# Patient Record
Sex: FEMALE | Race: NATIVE HAWAIIAN OR OTHER PACIFIC ISLANDER | NOT HISPANIC OR LATINO | Employment: FULL TIME | URBAN - NONMETROPOLITAN AREA
[De-identification: names, ages, dates, MRNs, and addresses within clinical notes are randomized per-mention and may not be internally consistent; named-entity substitution may affect disease eponyms.]

---

## 2023-06-15 ENCOUNTER — OCCUPATIONAL MEDICINE (OUTPATIENT)
Dept: URGENT CARE | Facility: PHYSICIAN GROUP | Age: 28
End: 2023-06-15
Payer: COMMERCIAL

## 2023-06-15 VITALS
HEART RATE: 79 BPM | WEIGHT: 293 LBS | SYSTOLIC BLOOD PRESSURE: 134 MMHG | TEMPERATURE: 97.2 F | HEIGHT: 64 IN | BODY MASS INDEX: 50.02 KG/M2 | DIASTOLIC BLOOD PRESSURE: 88 MMHG | OXYGEN SATURATION: 97 % | RESPIRATION RATE: 16 BRPM

## 2023-06-15 DIAGNOSIS — S39.012D STRAIN OF LUMBAR REGION, SUBSEQUENT ENCOUNTER: ICD-10-CM

## 2023-06-15 PROCEDURE — 99204 OFFICE O/P NEW MOD 45 MIN: CPT | Performed by: PHYSICIAN ASSISTANT

## 2023-06-15 PROCEDURE — 3079F DIAST BP 80-89 MM HG: CPT | Performed by: PHYSICIAN ASSISTANT

## 2023-06-15 PROCEDURE — 3075F SYST BP GE 130 - 139MM HG: CPT | Performed by: PHYSICIAN ASSISTANT

## 2023-06-15 RX ORDER — METHYLPREDNISOLONE 4 MG/1
TABLET ORAL
Qty: 21 TABLET | Refills: 0 | Status: SHIPPED | OUTPATIENT
Start: 2023-06-15

## 2023-06-15 ASSESSMENT — ENCOUNTER SYMPTOMS: BACK PAIN: 1

## 2023-06-15 NOTE — PROGRESS NOTES
"Subjective     Dandy Lee Wachsmuth is a 28 y.o. female who presents with Back Pain (Wc 6/12 pt was pushing lid open to roll off dumpster  and it would not budge, tried pulling and nothing, she continued pushing and finally was able to open lid, pt felt pain on lower back, pain radiating down to buttocks, pain radiates down R side of leg to feet and tingling sensation, some pain radiating down L leg to knee, 8/10 constant pain, started having some R shoulder pain yesterday, )      DOI 6/12/2023: Patient states she was opening a sliding lid on a giant rolloff dumpster and it was stuck and she used all of her strength to push open the lid and it finally opened and slammed to the other hand causing her back to drawer.  She is now having discomfort in her lower back.  Patient denies any previous history of low back pain or injury.  She states that yesterday her right shoulder started hurting.  She states after the initial injury she did go to [facility] in [place] and was evaluated there and had x-rays that came back normal.  She has been trying ibuprofen with minimal relief as well as IcyHot to the area.  Patient does endorse that she has pain shooting into her lower extremities mostly the right side.  No bowel or bladder dysfunction, no saddle anesthesia or other red flag signs noted.     Patient has been tolerating desk work over the last couple of days with no issues.    HPI  Patient presents today for evaluation of work-related injury as described above.  Review of Systems   Musculoskeletal:  Positive for back pain.     PMH: No pertinent past medical history to this problem  MEDS: Medications were reviewed in Epic  ALLERGIES: Allergies were reviewed in Epic  SOCHX: Works as a   FH: No pertinent family history to this problem         Objective     /88   Pulse 79   Temp 36.2 °C (97.2 °F) (Temporal)   Resp 16   Ht 1.626 m (5' 4\")   Wt (!) 136 kg (300 lb)   SpO2 97%   " BMI 51.49 kg/m²      Physical Exam  Vitals and nursing note reviewed.   Constitutional:       General: She is not in acute distress.     Appearance: Normal appearance. She is well-developed. She is not ill-appearing or toxic-appearing.   HENT:      Head: Normocephalic and atraumatic.      Right Ear: Hearing normal.      Left Ear: Hearing normal.   Cardiovascular:      Rate and Rhythm: Normal rate.   Pulmonary:      Effort: Pulmonary effort is normal.   Musculoskeletal:      Comments: As described below   Skin:     General: Skin is warm and dry.   Neurological:      Mental Status: She is alert.      Coordination: Coordination normal.   Psychiatric:         Mood and Affect: Mood normal.       No bony tenderness to palpation to the right shoulder.  Patient does have limited range of motion with abduction at shoulder height secondary to pain in the shoulder.  No midline spinous process tenderness or deformity noted to the lumbar back.  Slight tenderness to the paraspinous area.  Neurovascular intact distally.  Full strength against resistance with extension and flexion of the lower extremities.  Patient has decreased flexion and twisting at the hips.  Pain in the lower back with movement of the upper and lower extremities.  Tightness appreciated to the lumbar back paraspinal areas.             Assessment & Plan   1. Strain of lumbar region, subsequent encounter  Would recommend desk work with getting up with standing and walking as tolerated to take breaks throughout the day.  Would avoid any lifting over 10 pounds.  We will send in a steroid for patient to use at this time and also recommend she continue with topical IcyHot or Salonpas to the area.  Discussed gentle stretching and range of motion exercises.  If no improvement by next week may need to order an MRI at that point or refer further for evaluation.     Believe that shoulder pain could be more related to compensation with the way that she has been sleeping as  it did not initially hurt after the injury.    Please note that this dictation was created using voice recognition software. I have made every reasonable attempt to correct obvious errors, but I expect that there may be errors of grammar and possibly content that I did not discover before finalizing the note.

## 2023-06-15 NOTE — LETTER
[facility]  [place] [address]  Phone:  [phone] - Fax:  [phone]   [facility] Progress Report and Disability Certification  Date of Service: 6/15/2023   No Show:  No  Date / Time of Next Visit: 6/22/2023   Claim Information   Patient Name: Dandy Lee Wachsmuth  Claim Number:     Employer:   [employer] Date of Injury: 6/12/2023     Insurer / TPA: Lynn Flores  ID / SSN:     Occupation:   Diagnosis: The encounter diagnosis was Strain of lumbar region, subsequent encounter.    Medical Information   Related to Industrial Injury? Yes    Subjective Complaints:  DOI 6/12/2023: Patient states she was opening a sliding lid on a giant rolloff dumpster and it was stuck and she used all of her strength to push open the lid and it finally opened and slammed to the other hand causing her back to drawer.  She is now having discomfort in her lower back.  Patient denies any previous history of low back pain or injury.  She states that yesterday her right shoulder started hurting.  She states after the initial injury she did go to [facility] in [place] and was evaluated there and had x-rays that came back normal.  She has been trying ibuprofen with minimal relief as well as IcyHot to the area.  Patient does endorse that she has pain shooting into her lower extremities mostly the right side.  No bowel or bladder dysfunction, no saddle anesthesia or other red flag signs noted.   Objective Findings: No bony tenderness to palpation to the right shoulder.  Patient does have limited range of motion with abduction at shoulder height secondary to pain in the shoulder.  No midline spinous process tenderness or deformity noted to the lumbar back.  Slight tenderness to the paraspinous area.  Neurovascular intact distally.  Full strength against resistance with extension and flexion of the lower extremities.  Patient  has decreased flexion and twisting at the hips.  Pain in the lower back with movement of the upper and lower extremities.  Tightness appreciated to the lumbar back paraspinal areas.   Pre-Existing Condition(s):     Assessment:   Initial Visit    Status: Additional Care Required  Permanent Disability:No    Plan:      Diagnostics:      Comments:       Disability Information   Status: Released to Restricted Duty    From:  6/15/2023  Through: 2023 Restrictions are: Temporary   Physical Restrictions   Sitting:    Standing:  < or = to 1 hr/day Stoopin hrs/day Bendin hrs/day   Squattin hrs/day Walking:  < or = to 1 hr/day Climbin hrs/day Pushin hrs/day   Pullin hrs/day Other:    Reaching Above Shoulder (L):   Reaching Above Shoulder (R):       Reaching Below Shoulder (L):    Reaching Below Shoulder (R):      Not to exceed Weight Limits   Carrying(hrs):   Weight Limit(lb): < or = to 10 pounds Lifting(hrs):   Weight  Limit(lb): < or = to 10 pounds   Comments: Would recommend desk work with getting up with standing and walking as tolerated to take breaks throughout the day.  Would avoid any lifting over 10 pounds.  We will send in a steroid for patient to use at this time and also recommend she continue with topical IcyHot or Salonpas to the area.  Discussed gentle stretching and range of motion exercises.  If no improvement by next week may need to order an MRI at that point or refer further for evaluation.    Repetitive Actions   Hands: i.e. Fine Manipulations from Grasping:     Feet: i.e. Operating Foot Controls:     Driving / Operate Machinery:     Health Care Provider’s Original or Electronic Signature  Ollie Sorenson P.A.-C. Health Care Provider’s Original or Electronic Signature    Basim Henley DO MPH     Clinic Name / Location: [address] [place] [address] Clinic Phone Number: Dept: [phone]   Appointment Time: 9:00 Am Visit Start Time: 9:24  AM   Check-In Time:  9:14 Am Visit Discharge Time:  9:58 AM   Original-Treating Physician or Chiropractor    Page 2-Insurer/TPA    Page 3-Employer    Page 4-Employee

## 2023-06-22 ENCOUNTER — OCCUPATIONAL MEDICINE (OUTPATIENT)
Dept: URGENT CARE | Facility: PHYSICIAN GROUP | Age: 28
End: 2023-06-22
Payer: COMMERCIAL

## 2023-06-22 VITALS
RESPIRATION RATE: 16 BRPM | TEMPERATURE: 97.3 F | BODY MASS INDEX: 45.99 KG/M2 | OXYGEN SATURATION: 95 % | HEART RATE: 86 BPM | WEIGHT: 293 LBS | HEIGHT: 67 IN | DIASTOLIC BLOOD PRESSURE: 82 MMHG | SYSTOLIC BLOOD PRESSURE: 138 MMHG

## 2023-06-22 DIAGNOSIS — S46.911D RIGHT SHOULDER STRAIN, SUBSEQUENT ENCOUNTER: ICD-10-CM

## 2023-06-22 DIAGNOSIS — S39.012D STRAIN OF LUMBAR REGION, SUBSEQUENT ENCOUNTER: ICD-10-CM

## 2023-06-22 PROCEDURE — 99213 OFFICE O/P EST LOW 20 MIN: CPT | Performed by: NURSE PRACTITIONER

## 2023-06-22 PROCEDURE — 3079F DIAST BP 80-89 MM HG: CPT | Performed by: NURSE PRACTITIONER

## 2023-06-22 PROCEDURE — 3075F SYST BP GE 130 - 139MM HG: CPT | Performed by: NURSE PRACTITIONER

## 2023-06-22 RX ORDER — IBUPROFEN 800 MG/1
TABLET ORAL
Qty: 45 TABLET | Refills: 0 | Status: SHIPPED | OUTPATIENT
Start: 2023-06-22 | End: 2023-06-30

## 2023-06-22 RX ORDER — CYCLOBENZAPRINE HCL 5 MG
5-10 TABLET ORAL 3 TIMES DAILY PRN
Qty: 30 TABLET | Refills: 0 | Status: SHIPPED | OUTPATIENT
Start: 2023-06-22

## 2023-06-22 NOTE — LETTER
[facility]  [address]  [place] [address]  Phone:  [phone] - Fax:  [phone]   [facility] Progress Report and Disability Certification  Date of Service: 6/22/2023   No Show:  No  Date / Time of Next Visit: 7/13/2023   Claim Information   Patient Name: Dandy Lee Wachsmuth  Claim Number:     Employer:   [employer] Date of Injury: 6/12/2023     Insurer / TPA: Lynn Flores  ID / SSN:     Occupation:   Diagnosis: Diagnoses of Strain of lumbar region, subsequent encounter and Right shoulder strain, subsequent encounter were pertinent to this visit.    Medical Information   Related to Industrial Injury?   yes   Subjective Complaints:  Copied from previous visit on 6-.  DOI 6/12/2023:  JIE: Patient states she was opening a sliding lid on a giant rolloff dumpster and it was stuck and she used all of her strength to push open the lid and it finally opened and slammed to the other hand causing her back to drawer.     HPI 6/22/2023  Visit #3: Patient's first visit was at [facility], patient states no abnormal findings on physical exam or with x-rays.   Today, patient presents to clinic for follow-up for back injury.  Patient states that her symptoms are worsening and she is now having numbness and tingling running down the back side of her right leg that is constant.  She states the pain is causing her to have an altered gait. Pain is bilateral lower back right greater than left, 8-9/10, it is tight and stiffness.  Heat does mildly help with her symptoms.  She has been trying gentle stretching exercises, ibuprofen 800 mg twice daily, and did finish methylprednisolone Dosepak without improvement or relief in pain.    Patient denies any saddle paresthesia,  loss of bowel or bladder control, or lower extremity weakness.  She also continues to have right upper/posterior shoulder pain  rating it a 8-9/10, achy and stiff in  nature with certain movements.    Decreased range of motion- only about 90% reported.  Denies any radiculopathy, or swelling of her shoulder joint.   Objective Findings: Back: Decreased ROM flexion twisting and lateral flexion/extension, tightness appreciated in bilateral lumbar paraspinal areas, 5/5 LE strength, sensation intact bilaterally in LE, no TTP over spinous processes, paraspinals tenderness negative bilaterally , SI joint positive bilaterally, straight leg raise positive on right, negative on left.  Right Shoulder: Decreased ROM with abduction secondary to pain, muscle spasm palpated near clavicle, full strength, empty can test negative.     Pre-Existing Condition(s):     Assessment:   Condition Worsened    Status: Discharged / Care Transfer  Permanent Disability:     Plan: Medication  Comments:Continue on ibuprofen 800 mg twice daily.  New prescription of Flexeril 500 mg 3 times daily sent to pharmacy.  May continue with Tylenol, Salonpas, and Biofreeze.    Diagnostics:      Comments:       Disability Information   Status: Released to Restricted Duty    From:  6/22/2023  Through: 7/13/2023 Restrictions are: Temporary   Physical Restrictions   Sitting:    Standing:    Stooping:    Bending:      Squatting:    Walking:    Climbing:    Pushing:      Pulling:    Other:    Reaching Above Shoulder (L):   Reaching Above Shoulder (R):       Reaching Below Shoulder (L):    Reaching Below Shoulder (R):      Not to exceed Weight Limits   Carrying(hrs):   Weight Limit(lb):   Lifting(hrs):   Weight  Limit(lb):     Comments: Work restrictions include no lifting over 10 pounds, no bending, twisting, pushing, pulling, or reaching above or below head with right arm.  Would recommend desk work with regular breaks every 20 to 30 minutes for stretching and ambulation.  Continue on ibuprofen 800 mg twice daily.  New prescription of Flexeril 500 mg 3 times daily sent to pharmacy.  May continue with Tylenol, Salonpas, and  Biofreeze.  Discussed gentle stretching and range of motion exercises.  Referral placed to occupational medicine for further evaluation.  May need MRI    Repetitive Actions   Hands: i.e. Fine Manipulations from Grasping:     Feet: i.e. Operating Foot Controls:     Driving / Operate Machinery:     Health Care Provider’s Original or Electronic Signature  PAM Montero Health Care Provider’s Original or Electronic Signature    Basim Henley DO MPH     Clinic Name / Location: [address] [address] Clinic Phone Number: Dept: [phone]   Appointment Time: 1:30 Pm Visit Start Time: 1:44 PM   Check-In Time:  1:17 Pm Visit Discharge Time:  2:55 pm    Original-Treating Physician or Chiropractor    Page 2-Insurer/TPA    Page 3-Employer    Page 4-Employee

## 2023-06-22 NOTE — PROGRESS NOTES
Subjective:     Dandy Lee Wachsmuth is a 28 y.o. female who presents for Back Pain (WC follow up on Lower back and R shoulder, has not gotten any better, medication did not help, back and shoulder 8-9 pain, constant throbbing achy pain shoulder, back spasms )      Copied from previous visit on 6-.  DOI 6/12/2023:  JIE: Patient states she was opening a sliding lid on a giant rolloff dumpster and it was stuck and she used all of her strength to push open the lid and it finally opened and slammed to the other hand causing her back to drawer.     HPI 6/22/2023  Visit #3: Patient's first visit was at [facility], patient states no abnormal findings on physical exam or with x-rays.   Today, patient presents to clinic for follow-up for back injury.  Patient states that her symptoms are worsening and she is now having numbness and tingling running down the back side of her right leg that is constant.  She states the pain is causing her to have an altered gait. Pain is bilateral lower back right greater than left, 8-9/10, it is tight and stiffness.  Heat does mildly help with her symptoms.  She has been trying gentle stretching exercises, ibuprofen 800 mg twice daily, and did finish methylprednisolone Dosepak without improvement or relief in pain.    Patient denies any saddle paresthesia,  loss of bowel or bladder control, or lower extremity weakness.  She also continues to have right upper/posterior shoulder pain  rating it a 8-9/10, achy and stiff in nature with certain movements.    Decreased range of motion- only about 90% reported.  Denies any radiculopathy, or swelling of her shoulder joint.    PMH:   No pertinent past medical history to this problem  MEDS:  Medications were reviewed in EMR  ALLERGIES:  Allergies were reviewed in EMR  SOCHX:  Works as a   FH:   No pertinent family history to this problem       Objective:     /82   Pulse 86   Temp 36.3 °C (97.3 °F) (Temporal)   " Resp 16   Ht 1.702 m (5' 7\")   Wt (!) 140 kg (308 lb)   SpO2 95%   BMI 48.24 kg/m²     Back: Decreased ROM flexion twisting and lateral flexion/extension, tightness appreciated in bilateral lumbar paraspinal areas, 5/5 LE strength, sensation intact bilaterally in LE, no TTP over spinous processes, paraspinals tenderness negative bilaterally , SI joint positive bilaterally, straight leg raise positive on right, negative on left.  Right Shoulder: Decreased ROM with abduction secondary to pain, muscle spasm palpated near clavicle, full strength, empty can test negative.      Assessment/Plan:       1. Strain of lumbar region, subsequent encounter  - cyclobenzaprine (FLEXERIL) 5 mg tablet; Take 1-2 Tablets by mouth 3 times a day as needed for Muscle Spasms.  Dispense: 30 Tablet; Refill: 0  - ibuprofen (MOTRIN) 800 MG Tab; 1 TAB BY MOUTH EVERY 8 HOURS ONLY IF NEEDED FOR PAIN AND INFLAMMATION. TAKE WITH FOOD.  Dispense: 45 Tablet; Refill: 0  - Referral to Occupational Medicine    2. Right shoulder strain, subsequent encounter  - Referral to Occupational Medicine    Released to Restricted Duty FROM 6/22/2023 TO 7/13/2023  Work restrictions include no lifting over 10 pounds, no bending, twisting, pushing, pulling, or reaching above or below head with right arm.  Would recommend desk work with regular breaks every 20 to 30 minutes for stretching and ambulation.  Continue on ibuprofen 800 mg twice daily.  New prescription of Flexeril 500 mg 3 times daily sent to pharmacy.  May continue with Tylenol, Salonpas, and Biofreeze.  Discussed gentle stretching and range of motion exercises.  Referral placed to occupational medicine for further evaluation.  May need MRI       Differential diagnosis, natural history, supportive care, and indications for immediate follow-up discussed.    I have personally reviewed and discussed prior Worker's Comp. visit notes.        "

## 2023-06-30 ENCOUNTER — OCCUPATIONAL MEDICINE (OUTPATIENT)
Dept: OCCUPATIONAL MEDICINE | Facility: CLINIC | Age: 28
End: 2023-06-30
Payer: COMMERCIAL

## 2023-06-30 VITALS — BODY MASS INDEX: 48.24 KG/M2 | HEIGHT: 67 IN

## 2023-06-30 DIAGNOSIS — M54.16 LUMBAR RADICULOPATHY: ICD-10-CM

## 2023-06-30 DIAGNOSIS — S39.012D STRAIN OF LUMBAR REGION, SUBSEQUENT ENCOUNTER: ICD-10-CM

## 2023-06-30 PROCEDURE — 99203 OFFICE O/P NEW LOW 30 MIN: CPT | Performed by: PREVENTIVE MEDICINE

## 2023-06-30 RX ORDER — DICLOFENAC SODIUM 75 MG/1
75 TABLET, DELAYED RELEASE ORAL 2 TIMES DAILY
Qty: 60 TABLET | Refills: 0 | Status: SHIPPED | OUTPATIENT
Start: 2023-06-30

## 2023-06-30 NOTE — PROGRESS NOTES
"Subjective:     Dandy Lee Wachsmuth is a 28 y.o. 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  JIE: Patient states she was opening a sliding lid on a giant rolloff dumpster and it was stuck and she used all of her strength to push open the lid and it finally opened and slammed to the other hand causing her back to drawer.  She was seen in urgent care x2, advised NSAIDs, muscle relaxers and work restrictions.    6/30/23: Patient states that overall symptoms are little worse.  She states she is difficulty sleeping.  Pain is bilateral in nature most in the lower lumbar region.  Does get radiating pain no symptoms down the right leg.  She states that she took Flexeril but it made her too sleepy.  She was also told that this medication shows up in drug screens for her workplace and so has not been taking it.  She is taking some over-the-counter ibuprofen with minimal relief.  She has been working light duty.  Denies prior low back injuries.    ROS: All systems were reviewed on intake form, form was reviewed and signed. See scanned documents in media. Pertinent positives and negatives included in HPI.    PMH: No pertinent past medical history to this problem  MEDS: Medications were reviewed in Epic  ALLERGIES: No Known Allergies  SOCHX: Works as a  at [employer] [place] [employer]\""  FH: No pertinent family history to this problem       Objective:     Ht 1.702 m (5' 7\")   BMI 48.24 kg/m²     Constitutional: Patient is in no acute distress. Appears well-developed and well-nourished.   HENT: Normocephalic and atraumatic. EOM are normal. No scleral icterus.   Cardiovascular: Normal rate.    Pulmonary/Chest: Effort normal. No respiratory distress.   Neurological: Patient is alert and oriented to person, place, and time.   Skin: Skin is warm and dry.   Psychiatric: Normal mood and affect. Behavior is normal. "     Lumbar: No gross deformity.  Tenderness to palpation diffusely over the lower lumbar paraspinal musculature and SI joints bilaterally.  Range of motion diminished about 45 degrees flexion.  Reflexes intact.  Some slight weakness with right knee extension and hip flexion.  Straight leg test positive on right.  Antalgic gait.    Assessment/Plan:       1. Strain of lumbar region, subsequent encounter  - diclofenac DR (VOLTAREN) 75 MG Tablet Delayed Response; Take 1 Tablet by mouth 2 times a day.  Dispense: 60 Tablet; Refill: 0  - Referral to Radiology  - MR-LUMBAR SPINE-W/O; Future  - Referral to Physical Therapy    2. Lumbar radiculopathy  - diclofenac DR (VOLTAREN) 75 MG Tablet Delayed Response; Take 1 Tablet by mouth 2 times a day.  Dispense: 60 Tablet; Refill: 0  - Referral to Radiology  - MR-LUMBAR SPINE-W/O; Future  - Referral to Physical Therapy    Released to Restricted Duty FROM 6/30/2023 TO 7/28/2023  Seated work approximately 50% of shift.  Alternate between sitting and standing throughout the day as needed for comfort  Given lumbar radiculopathy, exam findings and duration of symptoms refer for MRI lumbar without  Referral for physical therapy  Prescribed diclofenac 75 mg twice daily  Okay to use Flexeril as prescribed  Okay to use OTC muscle creams/ointments as needed  Okay to use heat and/or ice as needed  Restricted duty  Follow-up 4 weeks, sooner if MRI performed sooner    Differential diagnosis, natural history, supportive care, and indications for immediate follow-up discussed.    Approximately 35 minutes were spent in reviewing notes, preparing for visit, obtaining history, exam and evaluation, patient counseling/education and post visit documentation/orders.

## 2023-06-30 NOTE — LETTER
[address],   [address] [place] [address]  Phone:  [phone] - Fax:  [phone]   [facility]up[facility] Progress Report and Disability Certification  Date of Service: 6/30/2023   No Show:  No  Date / Time of Next Visit: 7/28/2023 @ 1:00 PM   Claim Information   Patient Name: Dandy Lee [place]  Claim Number:     Employer:    Date of Injury: 6/12/2023     Insurer / TPA: Lynn Flores  ID / SSN:     Occupation:   Diagnosis: Diagnoses of Strain of lumbar region, subsequent encounter and Lumbar radiculopathy were pertinent to this visit.    Medical Information   Related to Industrial Injury? Yes    Subjective Complaints:  DOI 6/12/2023: 28-year-old injured worker presents with low back injury.  JIE: Patient states she was opening a sliding lid on a giant rolloff dumpster and it was stuck and she used all of her strength to push open the lid and it finally opened and slammed to the other hand causing her back to drawer.  She was seen in urgent care x2, advised NSAIDs, muscle relaxers and work restrictions.    6/30/23: Patient states that overall symptoms are little worse.  She states she is difficulty sleeping.  Pain is bilateral in nature most in the lower lumbar region.  Does get radiating pain no symptoms down the right leg.  She states that she took Flexeril but it made her too sleepy.  She was also told that this medication shows up in drug screens for her workplace and so has not been taking it.  She is taking some over-the-counter ibuprofen with minimal relief.  She has been working light duty.  Denies prior low back injuries.   Objective Findings: Lumbar: No gross deformity.  Tenderness to palpation diffusely over the lower lumbar paraspinal musculature and SI joints bilaterally.  Range of motion diminished about 45 degrees flexion.  Reflexes intact.  Some slight weakness with right knee extension and hip flexion.  Straight  leg test positive on right.  Antalgic gait.   Pre-Existing Condition(s):     Assessment:   Condition Same    Status: Additional Care Required  Permanent Disability:No    Plan:      Diagnostics:      Comments:  Given lumbar radiculopathy, exam findings and duration of symptoms refer for MRI lumbar without  Referral for physical therapy  Prescribed diclofenac 75 mg twice daily  Okay to use Flexeril as prescribed  Okay to use OTC muscle creams/ointments as needed  Okay to use heat and/or ice as needed  Restricted duty  Follow-up 4 weeks, sooner if MRI performed sooner    Disability Information   Status: Released to Restricted Duty    From:  6/30/2023  Through: 7/28/2023 Restrictions are: Temporary   Physical Restrictions   Sitting:    Standing:  < or = to 4 hrs/day Stooping:  < or = to 1 hr/day Bending:  < or = to 1 hr/day   Squatting:    Walking:  < or = to 4 hrs/day Climbing:    Pushing:      Pulling:    Other:    Reaching Above Shoulder (L):   Reaching Above Shoulder (R):       Reaching Below Shoulder (L):    Reaching Below Shoulder (R):      Not to exceed Weight Limits   Carrying(hrs):   Weight Limit(lb): < or = to 10 pounds Lifting(hrs):   Weight  Limit(lb): < or = to 10 pounds   Comments: Seated work approximately 50% of shift.  Alternate between sitting and standing throughout the day as needed for comfort    Repetitive Actions   Hands: i.e. Fine Manipulations from Grasping:     Feet: i.e. Operating Foot Controls:     Driving / Operate Machinery:     Health Care Provider’s Original or Electronic Signature  Basim Henley D.O. Health Care Provider’s Original or Electronic Signature    Basim Henley DO MPH     Clinic Name / Location: [address],   [address]  [place] [address] Clinic Phone Number: Dept: [phone]   Appointment Time: 1:00 Pm Visit Start Time: 1:25 PM   Check-In Time:  1:23 Pm Visit Discharge Time:  2:02 PM   Original-Treating Physician or Chiropractor    Page  2-Insurer/TPA    Page 3-Employer    Page 4-Employee

## 2023-07-28 ENCOUNTER — HOSPITAL ENCOUNTER (OUTPATIENT)
Dept: RADIOLOGY | Facility: MEDICAL CENTER | Age: 28
End: 2023-07-28
Payer: COMMERCIAL